# Patient Record
(demographics unavailable — no encounter records)

---

## 2025-03-12 NOTE — PLAN
[FreeTextEntry1] :  #HCM - CBC with differential, HIV, Hep C, CMP, hemoglobin A1c, vitamin B12, vitamin D, urine analysis, lipid panel, TFTs all ordered. Patient to be informed of results. - The patient has screened negative for depression  - The patient has never used tobacco products. - The [place] Preventive Service Task Force recommends yearly lung cancer screening with LDCT for people who have a 20 pack-year or more smoking history and smoke now or have quit within the past 15 years and are between 50 and 80 years old. - Blood pressure as taken in the office today is within normal limits (<120/<80). - EKG performed in the office today is within normal limits. - Counseled on maintaining a healthy body weight. It has been estimated that up to one-third of cardiovascular disease deaths may be preventable by healthy diet and physical activity. - Recommend applying wrist brace to right wrist. - Urology referral placed for urinary incontinence.

## 2025-03-12 NOTE — HISTORY OF PRESENT ILLNESS
[FreeTextEntry1] : pt is here for a physical  [de-identified] : 24-year-old female presenting today for physical examination.  She is reporting "generalized body aches" for the past couple years worse on the right wrist.  This occurs at rest.  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  She reports the right wrist is numb at times however denies weakness.  She takes magnesium and creatine daily as supplements as she has a .  She is also reporting chronic urinary incontinence.  She denies any spinal surgeries, leg weakness or back pain, denies fecal incontinence. She has been trying acupuncture for pain relief.

## 2025-03-12 NOTE — HEALTH RISK ASSESSMENT
[Very Good] : ~his/her~ current health as very good [Good] : ~his/her~  mood as  good [2 - 4 times a month (2 pts)] : 2-4 times a month (2 points) [1 or 2 (0 pts)] : 1 or 2 (0 points) [Never (0 pts)] : Never (0 points) [Yes] : In the past 12 months have you used drugs other than those required for medical reasons? Yes [Any fall with injury in past year] : Patient reported fall with injury in the past year [Little interest or pleasure doing things] : 1) Little interest or pleasure doing things [Feeling down, depressed, or hopeless] : 2) Feeling down, depressed, or hopeless [0] : 2) Feeling down, depressed, or hopeless: Not at all (0) [PHQ-2 Negative - No further assessment needed] : PHQ-2 Negative - No further assessment needed [HIV test declined] : HIV test declined [Hepatitis C test declined] : Hepatitis C test declined [Never] : Never [NO] : No [de-identified] : no [de-identified] : no [de-identified] : occasionally  [Audit-CScore] : 2 [de-identified] : pt smoke marijuana every day.  [de-identified] : pt is a little active  [de-identified] : pr have a normal diet  [de-identified] : pt fell 3 months ago and she did feel pain in her back after. She gets the back pains on and off.  [[id number]] : 0 [PapSmearComments] : pt never had one

## 2025-03-18 NOTE — END OF VISIT
[FreeTextEntry4] : This note was written by Rell Hickman on 03/18/2025 actively solely Lalit Jaquez M.D. I, Rell Hickman, am scribing for and in the presence of Lalit Jaquez M.D. in the following sections HISTORY OF PRESENT ILLNESS, PAST MEDICAL/FAMILY/SOCIAL HISTORY; REVIEW OF SYSTEMS; VITAL SIGNS; PHYSICAL EXAM; ASSESSMENT/PLAN. All medical record entries made by this scribe at , Lalit Jaquez M.D. direction and personally dictated by me on 03/18/2025. I personally performed the services described in the documentation, reviewed the documentation recorded by the scribe in my presence, and it accurately and completely records my words and actions.

## 2025-03-18 NOTE — ASSESSMENT
[FreeTextEntry1] : Pt is a 24 year old female with urinary incontinence.  check ua cx  reviewed kegel exercises advised reduce caffeine intake  reduce fluid intake  f/u urodynamics    Patient is being seen today for evaluation and management of a chronic and longitudinal ongoing condition and I am of the primary treating physician.  UA and culture done today

## 2025-03-18 NOTE — HISTORY OF PRESENT ILLNESS
[FreeTextEntry1] : 03/18/2025 cc urinary frequency and urgency Pt is a 24 year old female presenting for a new patient visit with c/o urinary frequency and urgency. Pt reports that about 3 years ago she noted issues with holding her urine. Pt reports that currently she is having difficulty with urinary urgency and frequency. Pt reports leaking occasionally after holding her urine. Pt denies stress-based incontinence. Pt reports having to wear liners which she changes every time she uses the bathroom. Pt reports moderate wetness levels while changing liners. Pt reports using the bathroom about 10x per day. Pt reports drinking a gallon of water a day as per 's orders. Pt reports training daily and with a  2x per week. Pt reports taking supplements such as magnesium, AG1 powder and creatine. Pt reports consume 1-2 cups of coffee every three days. Pt reports drinking coke a cola regular about 1-2x per week. Pt denies taking medication. Pt reports her bowel movements are fine and denies constipation. Pt reports that she feels like she is voiding fully and denies dysuria